# Patient Record
Sex: MALE | Race: ASIAN | NOT HISPANIC OR LATINO
[De-identification: names, ages, dates, MRNs, and addresses within clinical notes are randomized per-mention and may not be internally consistent; named-entity substitution may affect disease eponyms.]

---

## 2024-02-11 PROBLEM — Z00.00 ENCOUNTER FOR PREVENTIVE HEALTH EXAMINATION: Status: ACTIVE | Noted: 2024-02-11

## 2024-02-26 ENCOUNTER — LABORATORY RESULT (OUTPATIENT)
Age: 84
End: 2024-02-26

## 2024-02-26 ENCOUNTER — APPOINTMENT (OUTPATIENT)
Dept: PULMONOLOGY | Facility: CLINIC | Age: 84
End: 2024-02-26
Payer: MEDICARE

## 2024-02-26 VITALS
SYSTOLIC BLOOD PRESSURE: 168 MMHG | BODY MASS INDEX: 25.61 KG/M2 | WEIGHT: 150 LBS | DIASTOLIC BLOOD PRESSURE: 88 MMHG | TEMPERATURE: 97.5 F | HEIGHT: 64 IN | HEART RATE: 101 BPM | OXYGEN SATURATION: 84 %

## 2024-02-26 PROCEDURE — 99203 OFFICE O/P NEW LOW 30 MIN: CPT

## 2024-02-26 NOTE — REASON FOR VISIT
[Consultation] : a consultation [Pulmonary Fibrosis] : pulmonary fibrosis [Pulmonary Nodules] : pulmonary nodules

## 2024-02-26 NOTE — HISTORY OF PRESENT ILLNESS
[Former] : former [>= 20 pack years] : >= 20 pack years [Never] : never [TextBox_4] : 83  year old patient presents for evaluation of abnormal CT chest from 05/18/2023 that demonstrates worsening fibrotic changes and right middle lobe nodule.  He had CT chest 2017 at [facility] [place] that demonstrated minor interstitial changes.  He states symptoms improved at the time.  He has having worsening dyspnea on exertion, dry throat fatigue.  CT chest 05/18/2023 demonstrated interstitial changes with worsening fibrosis, mild honeycombing and bronchiectasis There was new subsolid 8 mm nodule in the right lower lobe  There was mediastinal lymphadenopathy He has no fever, chills, weight loss, or sputum production.  He has no systemic complaints, generalized joint aches, dysphagia, dry skin, dry mouth  He denies any occupational exposure, medication exposure.  He has no pets he describes that his apartment is [place].  He has lived there for over 40 years  He has a history of bladder cancer and liver cancer, both in remission.  He did not receive any radiation therapy.  Bladder cancer was treated with the BCG instillation       Primary doctor is  Dr Arden Jones     PSH: Partial hepatectomy       PMH: Bladder cancer  Liver cancer  Prostatic hypertrophy Atrial fibrillation on Eliquis  Hypertension  He is on Baraclude for liver cancer omeprazole for reflux   He did have COVID infection as well   He had nuclear stress test 1/20/2023 that did not demonstrate cardiac ischemia  Echocardiogram demonstrated mild left atrial dilatation EF was normal     SH:   never smoker     ETOH:  no     Occupation: retired, worked as a banker   No exposure to chemicals, dust, asbestos, mold  No pets     ALLERGY:   NKDA     environmental/seasonal allergy: dnies       Review of Systems:   No rash, skin problems No dry eyes no mouth ulcers no sinusitis, sinus infections, nasal obstruction no dysphagia no dry mouth   no joint aches no joint swelling     no pneumonia no wheeze no lung cancer   no CAD no MI no chest pain no murmur no CHF       no Diabetes no thyroid disease no hyperlipidemia     no bleeding   no DVT or PE   no kidney disease   no stroke no seizure                         [YearQuit] : 2014

## 2024-02-26 NOTE — PHYSICAL EXAM
[No Acute Distress] : no acute distress [Well Nourished] : well nourished [Normal Oropharynx] : normal oropharynx [Erythema] : erythema [Polyps] : polyps [Normal Appearance] : normal appearance [No Neck Mass] : no neck mass [Normal S1, S2] : normal s1, s2 [No Murmurs] : no murmurs [No Resp Distress] : no resp distress [Benign] : benign [No Clubbing] : no clubbing [No Edema] : no edema [No Focal Deficits] : no focal deficits [Oriented x3] : oriented x3 [TextBox_68] : Mild fine crackles

## 2024-02-26 NOTE — DISCUSSION/SUMMARY
[FreeTextEntry1] : 83-year-old man with apparent progressive interstitial fibrosis  He denies any exposure that would suggest hypersensitivity pneumonitis  The CT appearance is not typical for UIP.  UIP, hypersensitivity pneumonitis, drug-induced lung disease post-COVID changes and other possible causes are in the differential diagnosis  He is a former smoker who stopped 10 years ago  There is a groundglass nodule in the right middle lobe 8 mm, and mild mediastinal lymphadenopathy  Plan: Repeat noncontrast CT chest at this time to evaluate for further progression of interstitial lung disease and to monitor the right middle lobe nodule seen on CT scan 1 year ago  Obtain blood work for rheumatologic studies, allergies and hypersensitivity  Return for pulmonary function testing  Evaluate for need for oxygen therapy  Continue current regimen including Eliquis for atrial fibrillation  Zhen Stringer MD

## 2024-03-07 ENCOUNTER — TRANSCRIPTION ENCOUNTER (OUTPATIENT)
Age: 84
End: 2024-03-07

## 2024-03-10 LAB
A ALTERNATA IGE QN: <0.1 KUA/L
A FUMIGATUS IGE QN: <0.1 KUA/L
ALTERN TENCAPG(M6): 4.3 MCG/ML
ANTI-BETA2 GLYCOPROTEIN 1 IGG CONCENTRATION: 2 U/ML
ANTI-BETA2 GLYCOPROTEIN 1 IGM CONCENTRATION: <2.4 U/ML
ANTI-CARDIOLIPIN IGG CONCENTRATION: 4 GPL
ANTI-CARDIOLIPIN IGM CONCENTRATION: <0.9 MPL
ANTI-CENP IGG CONCENTRATION: <0.4 U/ML
ANTI-CYCLIC CITRULLINATED PEPTIDE IGG CONCENTRATION: 1 U/ML
ANTI-DOUBLE-STRANDED DNA IGG - CRITHIDIA FLUORESCENCE: NEGATIVE
ANTI-DOUBLE-STRANDED DNA IGG CONCENTRATION: 316 IU/ML
ANTI-JO-1 IGG CONCENTRATION: 0 U/ML
ANTI-NUCLEAR ANTIBODIES - CYTOPLASMIC PATTERN: NORMAL
ANTI-NUCLEAR ANTIBODIES - PRIMARY NUCLEAR PATTERN: NORMAL
ANTI-NUCLEAR ANTIBODIES - PRIMARY PATTERN TITER: NEGATIVE
ANTI-NUCLEAR ANTIBODIES IGG CONCENTRATION: 7 UNITS
ANTI-RNA POL III IGG CONCENTRATION: <0.7 U/ML
ANTI-RNP70 IGG CONCENTRATION: 1 U/ML
ANTI-RO52 IGG CONCENTRATION: 0 U/ML
ANTI-RO60 IGG CONCENTRATION: 0 U/ML
ANTI-SCL-70 IGG CONCENTRATION: <0.6 U/ML
ANTI-SMITH IGG CONCENTRATION: <0.7 U/ML
ANTI-SS-B (LA) IGG CONCENTRATION: 7 U/ML
ANTI-THYROGLOBULIN IGG CONCENTRATION: <12 IU/ML
ANTI-THYROID PEROXIDASE IGG CONCENTRATION: <4 IU/ML
ANTI-U1RNP IGG CONCENTRATION: 1 U/ML
ASPER FUMCAPG(M3): 12.7 MCG/ML
AUREOBASCAPG(M12): 6 MCG/ML
AVISE LUPUS INDEX: -4
AVISE LUPUS RESULT: NEGATIVE
B-LYMPHOCYTE-BOUND C4D (BC4D) LEVEL: 3
C ALBICANS IGE QN: <0.1 KUA/L
C HERBARUM IGE QN: <0.1 KUA/L
CAT DANDER IGE QN: <0.1 KUA/L
CK SERPL-CCNC: 46 U/L
COMMON RAGWEED IGE QN: <0.1 KUA/L
D FARINAE IGE QN: <0.1 KUA/L
D PTERONYSS IGE QN: <0.1 KUA/L
DEPRECATED A ALTERNATA IGE RAST QL: 0 (ref 0–?)
DEPRECATED A FUMIGATUS IGE RAST QL: 0 (ref 0–?)
DEPRECATED C ALBICANS IGE RAST QL: 0
DEPRECATED C HERBARUM IGE RAST QL: 0 (ref 0–?)
DEPRECATED CAT DANDER IGE RAST QL: 0 (ref 0–?)
DEPRECATED COMMON RAGWEED IGE RAST QL: 0 (ref 0–?)
DEPRECATED D FARINAE IGE RAST QL: 0 (ref 0–?)
DEPRECATED D PTERONYSS IGE RAST QL: 0 (ref 0–?)
DEPRECATED DOG DANDER IGE RAST QL: 0 (ref 0–?)
DEPRECATED M RACEMOSUS IGE RAST QL: 0
DEPRECATED ROACH IGE RAST QL: 0 (ref 0–?)
DEPRECATED TIMOTHY IGE RAST QL: 0 (ref 0–?)
DEPRECATED WHITE OAK IGE RAST QL: 0 (ref 0–?)
DOG DANDER IGE QN: <0.1 KUA/L
EOSINOPHIL # BLD MANUAL: 60 /UL
ERYTHROCYTE-BOUND C4D (EC4D) LEVEL: 2
HCT VFR BLD CALC: 41.3 %
HGB BLD-MCNC: 13.9 G/DL
LACEYELLA SACCHARI IGG: 3.8 MCG/ML
M RACEMOSUS IGE QN: <0.1 KUA/L
MCHC RBC-ENTMCNC: 29.8 PG
MCHC RBC-ENTMCNC: 33.7 GM/DL
MCV RBC AUTO: 88.4 FL
MICROPOLYCAPG(M22): <2 MCG/ML
PENIC CHRYCAPG(M1): 11 MCG/ML
PHOMA BETAE IGG: 5.4 MCG/ML
PLATELET # BLD AUTO: 193 K/UL
RBC # BLD: 4.67 M/UL
RBC # FLD: 15.5 %
RHEUMATOID FACTOR (IGA) CONCENTRATION: 8 IU/ML
RHEUMATOID FACTOR (IGM) CONCENTRATION: <0.6 IU/ML
ROACH IGE QN: <0.1 KUA/L
TIMOTHY IGE QN: <0.1 KUA/L
TOTAL IGE SMQN RAST: 26 KU/L
TRICHODERMA VIRIDE IGG: 4.1 MCG/ML
WBC # FLD AUTO: 6.74 K/UL
WHITE OAK IGE QN: <0.1 KUA/L

## 2024-03-10 NOTE — REASON FOR VISIT
[Consultation] : a consultation [Pulmonary Nodules] : pulmonary nodules [Pulmonary Fibrosis] : pulmonary fibrosis

## 2024-03-13 ENCOUNTER — APPOINTMENT (OUTPATIENT)
Dept: PULMONOLOGY | Facility: CLINIC | Age: 84
End: 2024-03-13
Payer: MEDICARE

## 2024-03-13 VITALS — SYSTOLIC BLOOD PRESSURE: 118 MMHG | TEMPERATURE: 97.7 F | DIASTOLIC BLOOD PRESSURE: 80 MMHG | HEART RATE: 62 BPM

## 2024-03-13 VITALS — WEIGHT: 146 LBS | BODY MASS INDEX: 25.06 KG/M2

## 2024-03-13 DIAGNOSIS — K44.9 DIAPHRAGMATIC HERNIA W/OUT OBSTRUCTION OR GANGRENE: ICD-10-CM

## 2024-03-13 PROCEDURE — 94060 EVALUATION OF WHEEZING: CPT

## 2024-03-13 PROCEDURE — 99214 OFFICE O/P EST MOD 30 MIN: CPT | Mod: 25

## 2024-03-13 PROCEDURE — 94729 DIFFUSING CAPACITY: CPT

## 2024-03-13 PROCEDURE — 94727 GAS DIL/WSHOT DETER LNG VOL: CPT

## 2024-03-13 NOTE — DISCUSSION/SUMMARY
[FreeTextEntry1] : 83-year-old man with apparent progressive interstitial fibrosis  He denies any exposure that would suggest hypersensitivity pneumonitis  The CT appearance is not typical for UIP.  UIP, hypersensitivity pneumonitis, drug-induced lung disease post-COVID changes and other possible causes are in the differential diagnosis  He is a former smoker who stopped 10 years ago  There is a groundglass nodule in the right middle lobe 8 mm, and mild mediastinal lymphadenopathy  He returns for follow up  Studies demonstrated positive ds DNA  Vasculitis, ANCA hypersensitivity allergy panels negative  he denies any exposures He was exposed at [facility]  he denies any rheumatologic symptoms, no joint swelling  he is having minor pain symptoms.  he is on Eliquis for atrial fibrillation No pets  He has  history of bladder cancer treated with BCG and mitomycin in 2011  CT chest  at [facility] on 03/06/2024 demonstrated chronic changes, resolution of  8 mm RML , Overall, changes are worse since  2017 Ct chest  There is mild honeycombing  Etiology of findings are unclear  Significance of positive ds DNA unclear  PFT demonstrated restriction  Plan:  Continue workup  Have referred to rheumatology  continue follow up  Consider need for further blood testing, empiric  treatment or lung biopsy   Evaluate for need for oxygen therapy  Continue current regimen including Eliquis for atrial fibrillation   Total time spent : 30 minutes Including: Preparation prior to visit - Reviewing prior record, results of tests and Consultation Reports as applicable Conducting an appropriate H & P during today's encounter Appropriate orders for tests, medications and procedures, as applicable Counseling patient  Note completion  Zhen Stringer MD

## 2024-03-13 NOTE — PROCEDURE
[FreeTextEntry1] : PFT  demonstrates restriction with diminished total lung capacity  74%. FEV1 and FVC are diminished with normal ratio.  The DLCO is diminished  Zhen Stringer MD

## 2024-03-13 NOTE — HISTORY OF PRESENT ILLNESS
[>= 20 pack years] : >= 20 pack years [Former] : former [Never] : never [TextBox_4] : 83  year old patient presents for evaluation of abnormal CT chest from 05/18/2023 that demonstrates worsening fibrotic changes and right middle lobe nodule.  He had CT chest 2017 at [facility] [place] that demonstrated minor interstitial changes.  He states symptoms improved at the time.  He has having worsening dyspnea on exertion, dry throat fatigue.  CT chest 05/18/2023 demonstrated interstitial changes with worsening fibrosis, mild honeycombing and bronchiectasis There was new subsolid 8 mm nodule in the right lower lobe  There was mediastinal lymphadenopathy He has no fever, chills, weight loss, or sputum production.  He has no systemic complaints, generalized joint aches, dysphagia, dry skin, dry mouth  He denies any occupational exposure, medication exposure.  He has no pets he describes that his apartment is basilia.  He has lived there for over 40 years  He has a history of bladder cancer and liver cancer, both in remission.  He did not receive any radiation therapy.  Bladder cancer was treated with the BCG instillation       Primary doctor is  Dr Arden Jones     PSH: Partial hepatectomy       PMH: Bladder cancer  Liver cancer  Prostatic hypertrophy Atrial fibrillation on Eliquis  Hypertension  He is on Baraclude for liver cancer omeprazole for reflux   He did have COVID infection as well   He had nuclear stress test 1/20/2023 that did not demonstrate cardiac ischemia  Echocardiogram demonstrated mild left atrial dilatation EF was normal     SH:   never smoker     ETOH:  no     Occupation: retired, worked as a banker   No exposure to chemicals, dust, asbestos, mold  No pets     ALLERGY:   NKDA     environmental/seasonal allergy: dnies       Review of Systems:   No rash, skin problems No dry eyes no mouth ulcers no sinusitis, sinus infections, nasal obstruction no dysphagia no dry mouth   no joint aches no joint swelling     no pneumonia no wheeze no lung cancer   no CAD no MI no chest pain no murmur no CHF       no Diabetes no thyroid disease no hyperlipidemia     no bleeding   no DVT or PE   no kidney disease   no stroke no seizure                         [YearQuit] : 2014

## 2024-03-13 NOTE — PHYSICAL EXAM
[No Acute Distress] : no acute distress [Normal Oropharynx] : normal oropharynx [Well Nourished] : well nourished [Erythema] : erythema [Polyps] : polyps [Normal Appearance] : normal appearance [No Neck Mass] : no neck mass [Normal S1, S2] : normal s1, s2 [No Resp Distress] : no resp distress [No Murmurs] : no murmurs [No Clubbing] : no clubbing [Benign] : benign [No Edema] : no edema [Oriented x3] : oriented x3 [No Focal Deficits] : no focal deficits [TextBox_68] : Mild fine crackles

## 2024-03-14 ENCOUNTER — RX RENEWAL (OUTPATIENT)
Age: 84
End: 2024-03-14

## 2024-06-03 ENCOUNTER — NON-APPOINTMENT (OUTPATIENT)
Age: 84
End: 2024-06-03

## 2024-06-04 ENCOUNTER — APPOINTMENT (OUTPATIENT)
Dept: RHEUMATOLOGY | Facility: CLINIC | Age: 84
End: 2024-06-04
Payer: MEDICARE

## 2024-06-04 ENCOUNTER — LABORATORY RESULT (OUTPATIENT)
Age: 84
End: 2024-06-04

## 2024-06-04 VITALS
DIASTOLIC BLOOD PRESSURE: 88 MMHG | BODY MASS INDEX: 25.78 KG/M2 | HEART RATE: 83 BPM | HEIGHT: 64 IN | TEMPERATURE: 98 F | OXYGEN SATURATION: 97 % | SYSTOLIC BLOOD PRESSURE: 148 MMHG | WEIGHT: 151 LBS | RESPIRATION RATE: 15 BRPM

## 2024-06-04 DIAGNOSIS — Z78.9 OTHER SPECIFIED HEALTH STATUS: ICD-10-CM

## 2024-06-04 DIAGNOSIS — J84.9 INTERSTITIAL PULMONARY DISEASE, UNSPECIFIED: ICD-10-CM

## 2024-06-04 DIAGNOSIS — K21.9 GASTRO-ESOPHAGEAL REFLUX DISEASE W/OUT ESOPHAGITIS: ICD-10-CM

## 2024-06-04 DIAGNOSIS — I48.91 UNSPECIFIED ATRIAL FIBRILLATION: ICD-10-CM

## 2024-06-04 DIAGNOSIS — E55.9 VITAMIN D DEFICIENCY, UNSPECIFIED: ICD-10-CM

## 2024-06-04 DIAGNOSIS — Z85.05 PERSONAL HISTORY OF MALIGNANT NEOPLASM OF LIVER: ICD-10-CM

## 2024-06-04 DIAGNOSIS — R79.89 OTHER SPECIFIED ABNORMAL FINDINGS OF BLOOD CHEMISTRY: ICD-10-CM

## 2024-06-04 DIAGNOSIS — Z87.891 PERSONAL HISTORY OF NICOTINE DEPENDENCE: ICD-10-CM

## 2024-06-04 DIAGNOSIS — J84.10 PULMONARY FIBROSIS, UNSPECIFIED: ICD-10-CM

## 2024-06-04 PROCEDURE — 99204 OFFICE O/P NEW MOD 45 MIN: CPT

## 2024-06-04 RX ORDER — CARVEDILOL 25 MG/1
25 TABLET, FILM COATED ORAL
Refills: 0 | Status: ACTIVE | COMMUNITY
Start: 2024-06-04

## 2024-06-04 RX ORDER — IRBESARTAN 300 MG/1
300 TABLET ORAL
Refills: 0 | Status: ACTIVE | COMMUNITY
Start: 2024-06-04

## 2024-06-04 RX ORDER — TAMSULOSIN HYDROCHLORIDE 0.4 MG/1
0.4 CAPSULE ORAL
Refills: 0 | Status: ACTIVE | COMMUNITY
Start: 2024-06-04

## 2024-06-04 RX ORDER — DOXYLAMINE SUCCINATE
POWDER (GRAM) MISCELLANEOUS
Refills: 0 | Status: ACTIVE | COMMUNITY
Start: 2024-06-04

## 2024-06-04 NOTE — PHYSICAL EXAM
[FreeTextEntry1] : mild expiratory rales  [Abdomen Soft] : soft [Abdomen Tenderness] : non-tender [Cervical Lymph Nodes Enlarged Posterior Bilaterally] : posterior cervical [Cervical Lymph Nodes Enlarged Anterior Bilaterally] : anterior cervical [Supraclavicular Lymph Nodes Enlarged Bilaterally] : supraclavicular [] : no rash [Skin Lesions] : no skin lesions [Oriented To Time, Place, And Person] : oriented to person, place, and time

## 2024-06-04 NOTE — HISTORY OF PRESENT ILLNESS
[FreeTextEntry1] : 82 y/o M here for consultation   Pt reports SOB, especially w exertion. Pt has intermittent phlegmy cough. Complains of fatigue. Also complains of mild fatigue. Pt says he has had these symptoms since 3/23.  Pt had COVID19 12/22, 12/23.  Pt reports he had work up and was found have pulmonary fibrosis. Pt also says blood tests showed "lupus"  has hx of liver and bladder cancer. In remission, he did not receive radiation. Bladder cancer pt reports "vaccine tx" and surgery; liver ca he was treated surgically.   Pt says he used to have pet birds, but last pet bird was 10 years ago. Pt was [place] [employer] working.   No fevers, h/a, rashes, hair loss, oral ulcers, epistaxis, sinusitis,  swollen glands,  CP,  vision changes, abdominal pain,  n/v/d, blood in stool or urine, focal weakness, sensory loss,  Raynaud's, weight loss.  +dry eyes, dry mouth  +GERD +sometime has foot pain when he walks

## 2024-06-04 NOTE — CONSULT LETTER
[Dear  ___] : Dear  [unfilled], [Consult Letter:] : I had the pleasure of evaluating your patient, [unfilled]. [Consult Closing:] : Thank you very much for allowing me to participate in the care of this patient.  If you have any questions, please do not hesitate to contact me. [Sincerely,] : Sincerely, [FreeTextEntry3] : Kamar Godfrey MD

## 2024-06-04 NOTE — DATA REVIEWED
[FreeTextEntry1] : Labs reviewed from 2/24 DsDNA 300s  CT chest 3/24 reviewed multiple midly enlarged intrathoracic lymph nodes, persistent mod diffuse b/l pulmonary reticulation in addition to volume loss, architectural disortion and traction bronchiectasis w a peripheral and lower lobe distribution, findings not changed since 5/23, but increased since 2017. 8 mm RML nodule. No definite evidence of honeycombing. Possible liver cirrhosis?   TTE reviewed 1/23 normal LV, RV function, mild AR , no pulm HTN  PFTs 3/24 FVC 59, FEV1 76, FEV1/, DLCO 64

## 2024-06-04 NOTE — ASSESSMENT
[FreeTextEntry1] : 82 y/o M w ILD/pulm fibrosis  =SOB on exertion, intermittent cough =GERD  =fatigue  =SICCA symptoms =3/24 CT chest multiple mildly enlarged intrathoracic lymph nodes, persistent mod diffuse b/l pulmonary reticulation in addition to volume loss, architectural distortion and traction bronchiectasis w a peripheral and lower lobe distribution, findings not changed since 5/23, but increased since 2017.  Will complete serologic profile. Pt does have some features that can be seen in AI dz. Will consider cellcept if this is case.   Plan -Labs w serologies, inflammatory markers RTO depending on above results

## 2024-06-11 ENCOUNTER — RX RENEWAL (OUTPATIENT)
Age: 84
End: 2024-06-11

## 2024-06-11 RX ORDER — OMEPRAZOLE 40 MG/1
40 CAPSULE, DELAYED RELEASE ORAL
Qty: 90 | Refills: 0 | Status: ACTIVE | COMMUNITY
Start: 2024-03-13 | End: 1900-01-01

## 2024-06-13 DIAGNOSIS — D89.2 HYPERGAMMAGLOBULINEMIA, UNSPECIFIED: ICD-10-CM

## 2024-06-13 LAB
24R-OH-CALCIDIOL SERPL-MCNC: 52.3 PG/ML
ACE BLD-CCNC: 82 U/L
ALBUMIN MFR SERPL ELPH: 50.2 %
ALBUMIN SERPL ELPH-MCNC: 3.6 G/DL
ALBUMIN SERPL-MCNC: 3.4 G/DL
ALBUMIN/GLOB SERPL: 1 RATIO
ALP BLD-CCNC: 112 U/L
ALPHA1 GLOB MFR SERPL ELPH: 3.7 %
ALPHA1 GLOB SERPL ELPH-MCNC: 0.3 G/DL
ALPHA2 GLOB MFR SERPL ELPH: 10.7 %
ALPHA2 GLOB SERPL ELPH-MCNC: 0.7 G/DL
ALT SERPL-CCNC: 121 U/L
ANA SER IF-ACNC: NEGATIVE
ANION GAP SERPL CALC-SCNC: 11 MMOL/L
APPEARANCE: CLEAR
AST SERPL-CCNC: 104 U/L
B-GLOBULIN MFR SERPL ELPH: 11.1 %
B-GLOBULIN SERPL ELPH-MCNC: 0.8 G/DL
BACTERIA: NEGATIVE /HPF
BASOPHILS # BLD AUTO: 0.05 K/UL
BASOPHILS NFR BLD AUTO: 0.6 %
BILIRUB SERPL-MCNC: 0.9 MG/DL
BILIRUBIN URINE: NEGATIVE
BLOOD URINE: NEGATIVE
BUN SERPL-MCNC: 11 MG/DL
C3 SERPL-MCNC: 118 MG/DL
C4 SERPL-MCNC: 16 MG/DL
CALCIUM SERPL-MCNC: 8.8 MG/DL
CAST: 0 /LPF
CCP AB SER IA-ACNC: <8 UNITS
CENP-A: <11 SI
CENP-B: <11 SI
CHLORIDE SERPL-SCNC: 99 MMOL/L
CK SERPL-CCNC: 43 U/L
CO2 SERPL-SCNC: 19 MMOL/L
COLOR: YELLOW
CREAT SERPL-MCNC: 0.93 MG/DL
CREAT SPEC-SCNC: 30 MG/DL
CREAT/PROT UR: 0.1 RATIO
CRP SERPL-MCNC: 8 MG/L
DEPRECATED KAPPA LC FREE/LAMBDA SER: 1.08 RATIO
DSDNA AB SER-ACNC: 1 IU/ML
EGFR: 81 ML/MIN/1.73M2
ENA RNP AB SER IA-ACNC: 0.4 AL
ENA SM AB SER IA-ACNC: <0.2 AL
ENA SS-A AB SER IA-ACNC: <0.2 AL
ENA SS-B AB SER IA-ACNC: 2.3 AL
EOSINOPHIL # BLD AUTO: 0.08 K/UL
EOSINOPHIL NFR BLD AUTO: 1 %
EPITHELIAL CELLS: 0 /HPF
ERYTHROCYTE [SEDIMENTATION RATE] IN BLOOD BY WESTERGREN METHOD: 22 MM/HR
FIBRILLARIN: <11 SI
G6PD SER-CCNC: 13.9 U/G HGB
GAMMA GLOB FLD ELPH-MCNC: 1.7 G/DL
GAMMA GLOB MFR SERPL ELPH: 24.3 %
GBM AB TITR SER IF: <0.2
GLUCOSE QUALITATIVE U: NEGATIVE MG/DL
GLUCOSE SERPL-MCNC: 116 MG/DL
HBV CORE IGG+IGM SER QL: REACTIVE
HBV SURFACE AB SER QL: NONREACTIVE
HBV SURFACE AG SER QL: REACTIVE
HCT VFR BLD CALC: 43 %
HCV AB SER QL: NONREACTIVE
HCV S/CO RATIO: 0.25 S/CO
HGB BLD-MCNC: 14.6 G/DL
IGA SER QL IEP: 263 MG/DL
IGG SER QL IEP: 1710 MG/DL
IGM SER QL IEP: 55 MG/DL
IMM GRANULOCYTES NFR BLD AUTO: 0.4 %
INTERPRETATION SERPL IEP-IMP: NORMAL
KAPPA LC CSF-MCNC: 4.67 MG/DL
KAPPA LC SERPL-MCNC: 5.03 MG/DL
KETONES URINE: NEGATIVE MG/DL
LDH SERPL-CCNC: 206 U/L
LEUKOCYTE ESTERASE URINE: NEGATIVE
LYMPHOCYTES # BLD AUTO: 2.57 K/UL
LYMPHOCYTES NFR BLD AUTO: 33.2 %
M PROTEIN MFR SERPL ELPH: NORMAL
M PROTEIN SPEC IFE-MCNC: NORMAL
M TB IFN-G BLD-IMP: NEGATIVE
MAN DIFF?: NORMAL
MCHC RBC-ENTMCNC: 31.5 PG
MCHC RBC-ENTMCNC: 34 GM/DL
MCV RBC AUTO: 92.7 FL
MICROSCOPIC-UA: NORMAL
MONOCLON BAND OBS SERPL: NORMAL
MONOCYTES # BLD AUTO: 0.8 K/UL
MONOCYTES NFR BLD AUTO: 10.3 %
MYELOPEROXIDASE AB SER QL IA: <5 UNITS
MYELOPEROXIDASE CELLS FLD QL: NEGATIVE
NEUTROPHILS # BLD AUTO: 4.22 K/UL
NEUTROPHILS NFR BLD AUTO: 54.5 %
NITRITE URINE: NEGATIVE
PH URINE: 6
PLATELET # BLD AUTO: 177 K/UL
PM/SCL-100: <11 SI
PM/SCL-75: <11 SI
POTASSIUM SERPL-SCNC: 4.7 MMOL/L
PROT SERPL-MCNC: 6.8 G/DL
PROT UR-MCNC: 4 MG/DL
PROTEIN URINE: NEGATIVE MG/DL
PROTEINASE3 AB SER IA-ACNC: <5 UNITS
PROTEINASE3 AB SER-ACNC: NEGATIVE
QUANTIFERON TB PLUS MITOGEN MINUS NIL: 7.79 IU/ML
QUANTIFERON TB PLUS NIL: 0.02 IU/ML
QUANTIFERON TB PLUS TB1 MINUS NIL: 0.01 IU/ML
QUANTIFERON TB PLUS TB2 MINUS NIL: 0.04 IU/ML
RBC # BLD: 4.64 M/UL
RBC # FLD: 15.7 %
RED BLOOD CELLS URINE: 0 /HPF
RF+CCP IGG SER-IMP: NEGATIVE
RHEUMATOID FACT SER QL: <10 IU/ML
RP11: <11 SI
RP155: <11 SI
SCL-70: <11 SI
SODIUM SERPL-SCNC: 129 MMOL/L
SPECIFIC GRAVITY URINE: 1.01
TH/TO: <11 SI
U1-SNRNP RNP A: <11 SI
U1-SNRNP RNP C: <11 SI
U1-SNRNP RNP-70KD: <11 SI
UROBILINOGEN URINE: 0.2 MG/DL
WBC # FLD AUTO: 7.75 K/UL
WHITE BLOOD CELLS URINE: 0 /HPF

## 2024-06-25 ENCOUNTER — APPOINTMENT (OUTPATIENT)
Dept: RHEUMATOLOGY | Facility: CLINIC | Age: 84
End: 2024-06-25

## 2024-06-26 ENCOUNTER — NON-APPOINTMENT (OUTPATIENT)
Age: 84
End: 2024-06-26

## 2024-06-26 LAB
ALBUMIN SERPL ELPH-MCNC: 3.8 G/DL
ALP BLD-CCNC: 112 U/L
ALT SERPL-CCNC: 74 U/L
ANION GAP SERPL CALC-SCNC: 9 MMOL/L
AST SERPL-CCNC: 65 U/L
BILIRUB SERPL-MCNC: 0.9 MG/DL
BUN SERPL-MCNC: 8 MG/DL
CALCIUM SERPL-MCNC: 9.4 MG/DL
CHLORIDE SERPL-SCNC: 102 MMOL/L
CO2 SERPL-SCNC: 26 MMOL/L
CREAT SERPL-MCNC: 1.03 MG/DL
EGFR: 72 ML/MIN/1.73M2
EJ AB SER QL: NEGATIVE
ENA JO1 AB SER IA-ACNC: <20 UNITS
ENA PM/SCL AB SER-ACNC: <20 UNITS
ENA SM+RNP AB SER IA-ACNC: <20 UNITS
ENA SS-A IGG SER QL: <20 UNITS
FIBRILLARIN AB SER QL: NEGATIVE
GLUCOSE SERPL-MCNC: 120 MG/DL
KU AB SER QL: NEGATIVE
MDA-5 (P140)(CADM-140): <20 UNITS
MI2 AB SER QL: NEGATIVE
NXP-2 (P140): <20 UNITS
OJ AB SER QL: NEGATIVE
PL12 AB SER QL: NEGATIVE
PL7 AB SER QL: NEGATIVE
POTASSIUM SERPL-SCNC: 4.7 MMOL/L
PROT SERPL-MCNC: 7 G/DL
SODIUM SERPL-SCNC: 136 MMOL/L
SRP AB SERPL QL: NEGATIVE
TIF GAMMA (P155/140): <20 UNITS
U2 SNRNP AB SER QL: NEGATIVE